# Patient Record
Sex: FEMALE | URBAN - METROPOLITAN AREA
[De-identification: names, ages, dates, MRNs, and addresses within clinical notes are randomized per-mention and may not be internally consistent; named-entity substitution may affect disease eponyms.]

---

## 2019-01-01 ENCOUNTER — APPOINTMENT (RX ONLY)
Dept: URBAN - METROPOLITAN AREA CLINIC 170 | Facility: CLINIC | Age: 84
Setting detail: DERMATOLOGY
End: 2019-01-01

## 2019-01-01 ENCOUNTER — RX ONLY (OUTPATIENT)
Age: 84
Setting detail: RX ONLY
End: 2019-01-01

## 2019-01-01 VITALS — DIASTOLIC BLOOD PRESSURE: 81 MMHG | HEART RATE: 65 BPM | SYSTOLIC BLOOD PRESSURE: 142 MMHG

## 2019-01-01 DIAGNOSIS — Z85.828 PERSONAL HISTORY OF OTHER MALIGNANT NEOPLASM OF SKIN: ICD-10-CM

## 2019-01-01 DIAGNOSIS — Z48.02 ENCOUNTER FOR REMOVAL OF SUTURES: ICD-10-CM

## 2019-01-01 DIAGNOSIS — L57.0 ACTINIC KERATOSIS: ICD-10-CM

## 2019-01-01 DIAGNOSIS — D22 MELANOCYTIC NEVI: ICD-10-CM

## 2019-01-01 DIAGNOSIS — Z48.817 ENCOUNTER FOR SURGICAL AFTERCARE FOLLOWING SURGERY ON THE SKIN AND SUBCUTANEOUS TISSUE: ICD-10-CM

## 2019-01-01 DIAGNOSIS — L81.4 OTHER MELANIN HYPERPIGMENTATION: ICD-10-CM

## 2019-01-01 DIAGNOSIS — D18.0 HEMANGIOMA: ICD-10-CM

## 2019-01-01 DIAGNOSIS — L82.1 OTHER SEBORRHEIC KERATOSIS: ICD-10-CM

## 2019-01-01 PROCEDURE — 11102 TANGNTL BX SKIN SINGLE LES: CPT

## 2019-01-01 PROCEDURE — ? ORDER TESTS

## 2019-01-01 PROCEDURE — 13101 CMPLX RPR TRUNK 2.6-7.5 CM: CPT

## 2019-01-01 PROCEDURE — 17003 DESTRUCT PREMALG LES 2-14: CPT

## 2019-01-01 PROCEDURE — 11103 TANGNTL BX SKIN EA SEP/ADDL: CPT

## 2019-01-01 PROCEDURE — ? SUTURE REMOVAL (GLOBAL PERIOD)

## 2019-01-01 PROCEDURE — ? REPAIR NOTE

## 2019-01-01 PROCEDURE — 17000 DESTRUCT PREMALG LESION: CPT

## 2019-01-01 PROCEDURE — 99212 OFFICE O/P EST SF 10 MIN: CPT | Mod: 25

## 2019-01-01 PROCEDURE — ? ADDITIONAL NOTES

## 2019-01-01 PROCEDURE — ? EXCISION

## 2019-01-01 PROCEDURE — 99024 POSTOP FOLLOW-UP VISIT: CPT

## 2019-01-01 PROCEDURE — 17313 MOHS 1 STAGE T/A/L: CPT

## 2019-01-01 PROCEDURE — ? COUNSELING

## 2019-01-01 PROCEDURE — 12032 INTMD RPR S/A/T/EXT 2.6-7.5: CPT

## 2019-01-01 PROCEDURE — 99214 OFFICE O/P EST MOD 30 MIN: CPT | Mod: 25

## 2019-01-01 PROCEDURE — ? BIOPSY BY SHAVE METHOD

## 2019-01-01 PROCEDURE — ? FULL BODY SKIN EXAM

## 2019-01-01 PROCEDURE — 17000 DESTRUCT PREMALG LESION: CPT | Mod: 59

## 2019-01-01 PROCEDURE — 11603 EXC TR-EXT MAL+MARG 2.1-3 CM: CPT

## 2019-01-01 PROCEDURE — 99213 OFFICE O/P EST LOW 20 MIN: CPT | Mod: 25

## 2019-01-01 PROCEDURE — 11602 EXC TR-EXT MAL+MARG 1.1-2 CM: CPT

## 2019-01-01 PROCEDURE — ? POST-OP WOUND EVALUATION

## 2019-01-01 PROCEDURE — ? LIQUID NITROGEN

## 2019-01-01 PROCEDURE — ? PRESCRIPTION

## 2019-01-01 PROCEDURE — ? MOHS SURGERY

## 2019-01-01 RX ORDER — DOXYCYCLINE HYCLATE 100 MG/1
TABLET, COATED ORAL
Qty: 20 | Refills: 0 | Status: ERX

## 2019-01-01 RX ORDER — IMIQUIMOD 50 MG/G
CREAM TOPICAL
Qty: 2 | Refills: 2 | Status: ERX | COMMUNITY
Start: 2019-01-01

## 2019-01-01 RX ADMIN — IMIQUIMOD: 50 CREAM TOPICAL at 00:00

## 2019-01-01 ASSESSMENT — LOCATION DETAILED DESCRIPTION DERM
LOCATION DETAILED: LEFT DISTAL DORSAL FOREARM
LOCATION DETAILED: LEFT PROXIMAL DORSAL FOREARM
LOCATION DETAILED: RIGHT LATERAL SUPERIOR CHEST
LOCATION DETAILED: LEFT MEDIAL FOREHEAD
LOCATION DETAILED: LEFT SUPERIOR LATERAL UPPER BACK
LOCATION DETAILED: UPPER STERNUM
LOCATION DETAILED: MIDDLE STERNUM
LOCATION DETAILED: MIDDLE STERNUM
LOCATION DETAILED: XIPHOID
LOCATION DETAILED: LEFT PROXIMAL POSTERIOR UPPER ARM
LOCATION DETAILED: RIGHT DISTAL POSTERIOR UPPER ARM
LOCATION DETAILED: LEFT LATERAL SUPERIOR CHEST
LOCATION DETAILED: LEFT SUPERIOR UPPER BACK
LOCATION DETAILED: LEFT MEDIAL BREAST 10-11:00 REGION
LOCATION DETAILED: LEFT LATERAL SUPERIOR CHEST
LOCATION DETAILED: LOWER STERNUM
LOCATION DETAILED: LEFT LATERAL SUPERIOR CHEST
LOCATION DETAILED: LEFT DISTAL POSTERIOR UPPER ARM
LOCATION DETAILED: STERNAL NOTCH
LOCATION DETAILED: RIGHT LATERAL SUPERIOR CHEST
LOCATION DETAILED: RIGHT LATERAL SUPERIOR CHEST
LOCATION DETAILED: LEFT MEDIAL SUPERIOR CHEST

## 2019-01-01 ASSESSMENT — LOCATION SIMPLE DESCRIPTION DERM
LOCATION SIMPLE: CHEST
LOCATION SIMPLE: LEFT UPPER BACK
LOCATION SIMPLE: RIGHT POSTERIOR UPPER ARM
LOCATION SIMPLE: LEFT FOREARM
LOCATION SIMPLE: CHEST
LOCATION SIMPLE: LEFT POSTERIOR UPPER ARM
LOCATION SIMPLE: CHEST
LOCATION SIMPLE: LEFT BREAST
LOCATION SIMPLE: ABDOMEN
LOCATION SIMPLE: LEFT FOREHEAD
LOCATION SIMPLE: CHEST

## 2019-01-01 ASSESSMENT — LOCATION ZONE DERM
LOCATION ZONE: ARM
LOCATION ZONE: TRUNK
LOCATION ZONE: TRUNK
LOCATION ZONE: FACE
LOCATION ZONE: TRUNK

## 2019-02-13 ENCOUNTER — APPOINTMENT (RX ONLY)
Dept: URBAN - METROPOLITAN AREA CLINIC 170 | Facility: CLINIC | Age: 84
Setting detail: DERMATOLOGY
End: 2019-02-13

## 2019-02-13 DIAGNOSIS — L72.0 EPIDERMAL CYST: ICD-10-CM

## 2019-02-13 PROBLEM — I10 ESSENTIAL (PRIMARY) HYPERTENSION: Status: ACTIVE | Noted: 2019-02-13

## 2019-02-13 PROCEDURE — ? PRESCRIPTION SAMPLES PROVIDED

## 2019-02-13 PROCEDURE — ? PRESCRIPTION

## 2019-02-13 PROCEDURE — 99202 OFFICE O/P NEW SF 15 MIN: CPT

## 2019-02-13 PROCEDURE — ? ADDITIONAL NOTES

## 2019-02-13 RX ORDER — DOXYCYCLINE HYCLATE 100 MG/1
TABLET, COATED ORAL
Qty: 28 | Refills: 0 | Status: ERX | COMMUNITY
Start: 2019-02-13

## 2019-02-13 RX ADMIN — DOXYCYCLINE HYCLATE 1: 100 TABLET, COATED ORAL at 00:00

## 2019-02-13 ASSESSMENT — LOCATION DETAILED DESCRIPTION DERM: LOCATION DETAILED: LEFT CLAVICULAR SKIN

## 2019-02-13 ASSESSMENT — LOCATION SIMPLE DESCRIPTION DERM: LOCATION SIMPLE: LEFT CLAVICULAR SKIN

## 2019-02-13 ASSESSMENT — LOCATION ZONE DERM: LOCATION ZONE: TRUNK

## 2019-02-13 NOTE — PROCEDURE: ADDITIONAL NOTES
Detail Level: Detailed
Additional Notes: Polysporin BID to area. If no significant decrease in erythema, plan shave biopsy to r/o BCC/SCC in two weeks.

## 2019-03-06 ENCOUNTER — APPOINTMENT (RX ONLY)
Dept: URBAN - METROPOLITAN AREA CLINIC 170 | Facility: CLINIC | Age: 84
Setting detail: DERMATOLOGY
End: 2019-03-06

## 2019-03-06 PROBLEM — D48.5 NEOPLASM OF UNCERTAIN BEHAVIOR OF SKIN: Status: ACTIVE | Noted: 2019-03-06

## 2019-03-06 PROCEDURE — 11102 TANGNTL BX SKIN SINGLE LES: CPT

## 2019-03-06 PROCEDURE — ? BIOPSY BY SHAVE METHOD

## 2019-03-06 NOTE — PROCEDURE: BIOPSY BY SHAVE METHOD
Anesthesia Volume In Cc (Will Not Render If 0): 5
Electrodesiccation And Curettage Text: The wound bed was treated with electrodesiccation and curettage after the biopsy was performed.
Dressing: Band-Aid
Type Of Destruction Used: Curettage
Billing Type: Third-Party Bill
Biopsy Type: H and E
Path Notes (To The Dermatopathologist): Check margins for R/O atypia only
Hemostasis: Aluminum Chloride and Electrocautery
Notification Instructions: Patient will be notified of biopsy results. However, patient instructed to call the office if not contacted within 2 weeks.
Curettage Text: The wound bed was treated with curettage after the biopsy was performed.
Silver Nitrate Text: The wound bed was treated with silver nitrate after the biopsy was performed.
Post-Care Instructions: I reviewed with the patient in detail post-care instructions. Patient is to keep the biopsy site dry overnight, and then apply polysporin twice daily until healed.
Was A Bandage Applied: Yes
Cryotherapy Text: The wound bed was treated with cryotherapy after the biopsy was performed.
Destruction After The Procedure: No
Consent: Written consent was obtained and risks were reviewed including but not limited to scarring, infection, bleeding, scabbing, incomplete removal, nerve damage and allergy to anesthesia.
Biopsy Method: double edge Personna blade
Detail Level: Detailed
Lab: 11
Depth Of Biopsy: dermis
X Size Of Lesion In Cm: 0
Electrodesiccation Text: The wound bed was treated with electrodesiccation after the biopsy was performed.
Size Of Lesion In Cm: 2.4
Anesthesia Type: 2% lidocaine with epinephrine
Wound Care: Bacitracin

## 2019-03-06 NOTE — HPI: CYST
How Severe Is Your Cyst?: moderate
Is This A New Presentation, Or A Follow-Up?: Follow Up Cyst
Additional History: Patient finished the doxycycline last Thursday. No improvement. \\n

## 2019-04-10 PROBLEM — C44.529 SQUAMOUS CELL CARCINOMA OF SKIN OF OTHER PART OF TRUNK: Status: ACTIVE | Noted: 2019-01-01

## 2019-04-10 NOTE — PROCEDURE: EXCISION
Yes, you are right.  No worker's comp or any injury in her history.  I ordered work conditioning because I think it will be needed to get her back to work, especially since it has to be full duty with no restrictions.   Advancement Flap (Double) Text: The defect edges were debeveled with a #15 scalpel blade.  Given the location of the defect and the proximity to free margins a double advancement flap was deemed most appropriate.  Using a sterile surgical marker, the appropriate advancement flaps were drawn incorporating the defect and placing the expected incisions within the relaxed skin tension lines where possible.    The area thus outlined was incised deep to adipose tissue with a #15 scalpel blade.  The skin margins were undermined to an appropriate distance in all directions utilizing iris scissors.

## 2019-04-10 NOTE — PROCEDURE: EXCISION
Path Notes (To The Dermatopathologist): Please check margins. see attachment for previous path from [facility]

## 2019-04-12 PROBLEM — Z48.817 ENCOUNTER FOR SURGICAL AFTERCARE FOLLOWING SURGERY ON THE SKIN AND SUBCUTANEOUS TISSUE: Status: ACTIVE | Noted: 2019-01-01

## 2019-04-12 NOTE — PROCEDURE: POST-OP WOUND EVALUATION
Sutures?: intact
Wound Diameter In Cm(Optional): 0
Patient To Follow-Up With?: our clinic
Add 16813 Cpt? (Important Note: In 2017 The Use Of 40820 Is Being Tracked By Cms To Determine Future Global Period Reimbursement For Global Periods): yes
Wound Granulation?: early
Wound Discharge?: moderate discharge
Detail Level: Detailed
Wound Crusting?: crusted
Wound Color?: pink
Wound Evaluated By (Optional): Siria Hastings

## 2019-04-24 PROBLEM — Z48.02 ENCOUNTER FOR REMOVAL OF SUTURES: Status: ACTIVE | Noted: 2019-01-01

## 2019-04-24 NOTE — PROCEDURE: SUTURE REMOVAL (GLOBAL PERIOD)
Detail Level: Detailed
Add 96608 Cpt? (Important Note: In 2017 The Use Of 84142 Is Being Tracked By Cms To Determine Future Global Period Reimbursement For Global Periods): no

## 2019-07-22 PROBLEM — D18.01 HEMANGIOMA OF SKIN AND SUBCUTANEOUS TISSUE: Status: ACTIVE | Noted: 2019-01-01

## 2019-07-22 PROBLEM — Z85.828 PERSONAL HISTORY OF OTHER MALIGNANT NEOPLASM OF SKIN: Status: ACTIVE | Noted: 2019-01-01

## 2019-07-22 PROBLEM — L81.4 OTHER MELANIN HYPERPIGMENTATION: Status: ACTIVE | Noted: 2019-01-01

## 2019-07-22 PROBLEM — D48.5 NEOPLASM OF UNCERTAIN BEHAVIOR OF SKIN: Status: ACTIVE | Noted: 2019-01-01

## 2019-07-22 PROBLEM — L57.0 ACTINIC KERATOSIS: Status: ACTIVE | Noted: 2019-01-01

## 2019-07-22 PROBLEM — L82.1 OTHER SEBORRHEIC KERATOSIS: Status: ACTIVE | Noted: 2019-01-01

## 2019-07-22 PROBLEM — D22.5 MELANOCYTIC NEVI OF TRUNK: Status: ACTIVE | Noted: 2019-01-01

## 2019-07-22 NOTE — PROCEDURE: COUNSELING
Detail Level: Generalized
Detail Level: Detailed
Detail Level: Zone
Patient Specific Counseling (Will Not Stick From Patient To Patient): \\nPt has multiple AKs however deferred LN2 given her age, she prefers only the symptomatic ones be treated

## 2019-07-22 NOTE — PROCEDURE: BIOPSY BY SHAVE METHOD
Size Of Lesion In Cm: 0.5
Notification Instructions: Patient will be notified of biopsy results. However, patient instructed to call the office if not contacted within 2 weeks.
Cryotherapy Text: The wound bed was treated with cryotherapy after the biopsy was performed.
Additional Anesthesia Volume In Cc (Will Not Render If 0): 0
Wound Care: Petrolatum
Lab Facility: [id number]
Destruction After The Procedure: No
Anesthesia Type: 1% Xylocaine with epinephrine
Post-Care Instructions: I reviewed with the patient in detail post-care instructions. Patient is to keep the biopsy site dry overnight, and then apply bacitracin twice daily until healed. Patient may apply hydrogen peroxide soaks to remove any crusting.
Biopsy Type: H and E
Consent: Written consent was obtained and risks were reviewed including but not limited to scarring, infection, bleeding, scabbing, incomplete removal, nerve damage and allergy to anesthesia.
X Size Of Lesion In Cm: 0.6
Depth Of Biopsy: dermis
Electrodesiccation Text: The wound bed was treated with electrodesiccation after the biopsy was performed.
Silver Nitrate Text: The wound bed was treated with silver nitrate after the biopsy was performed.
Dressing: Band-Aid
Anesthesia Volume In Cc (Will Not Render If 0): 1
Type Of Destruction Used: Curettage
Electrodesiccation And Curettage Text: The wound bed was treated with electrodesiccation and curettage after the biopsy was performed.
Lab: -[id number]
Curettage Text: The wound bed was treated with curettage after the biopsy was performed.
Anticipated Plan (Based On Presumed Biopsy Results): edc vs exc if +. pt prefers edc
Detail Level: Detailed
Billing Type: Third-Party Bill
Biopsy Method: double edge Personna blade
Hemostasis: Electrodesiccation and Aluminum Chloride
Was A Bandage Applied: Yes
X Size Of Lesion In Cm: 2.3
Anticipated Plan (Based On Presumed Biopsy Results): ideally aldara given size, however may need to consider mohs if invasive
Size Of Lesion In Cm: 2.5

## 2019-08-20 PROBLEM — C44.519 BASAL CELL CARCINOMA OF SKIN OF OTHER PART OF TRUNK: Status: ACTIVE | Noted: 2019-01-01

## 2019-08-20 PROBLEM — D04.5 CARCINOMA IN SITU OF SKIN OF TRUNK: Status: ACTIVE | Noted: 2019-01-01

## 2019-08-20 NOTE — PROCEDURE: EXCISION
None Bilobed Transposition Flap Text: The defect edges were debeveled with a #15 scalpel blade.  Given the location of the defect and the proximity to free margins a bilobed transposition flap was deemed most appropriate.  Using a sterile surgical marker, an appropriate bilobe flap drawn around the defect.    The area thus outlined was incised deep to adipose tissue with a #15 scalpel blade.  The skin margins were undermined to an appropriate distance in all directions utilizing iris scissors.

## 2019-09-03 PROBLEM — Z48.02 ENCOUNTER FOR REMOVAL OF SUTURES: Status: ACTIVE | Noted: 2019-01-01

## 2019-09-03 NOTE — PROCEDURE: SUTURE REMOVAL (GLOBAL PERIOD)
Detail Level: Detailed
Add 80004 Cpt? (Important Note: In 2017 The Use Of 01718 Is Being Tracked By Cms To Determine Future Global Period Reimbursement For Global Periods): no

## 2019-09-27 PROBLEM — C44.519 BASAL CELL CARCINOMA OF SKIN OF OTHER PART OF TRUNK: Status: ACTIVE | Noted: 2019-01-01

## 2019-09-27 NOTE — PROCEDURE: REPAIR NOTE
84 Closure 2 Information: This tab is for additional flaps and grafts, including complex repair and grafts and complex repair and flaps. You can also specify a different location for the additional defect, if the location is the same you do not need to select a new one. We will insert the automated text for the repair you select below just as we do for solitary flaps and grafts. Please note that at this time if you select a location with a different insurance zone you will need to override the ICD10 and CPT if appropriate.

## 2019-09-27 NOTE — PROCEDURE: MOHS SURGERY
D/C note       Subjective   Vesta Landry reports: 0/10 knee pain at rest. States she had to walk a lot of steps yesterday and her knees and back did very well. States she had a little soreness at the end but she feels fine today. States she still has a little knee pain occasionally but mostly when the weather is bad.     Objective       Active Range of Motion   Left Knee   Flexion: 118 degrees   Extension: WFL    Right Knee   Flexion: 115 degrees   Extension: WFL    Strength/Myotome Testing     Left Hip   Planes of Motion   Flexion: 4+  Abduction: 4+    Right Hip   Planes of Motion   Flexion: 4+  Abduction: 4+    Left Knee   Flexion: 4  Extension: 4    Right Knee   Flexion: 4  Extension: 4     See Exercise, Manual, and Modality Logs for complete treatment.       Assessment/Plan  Patient has met all goals at this time and reports confidence in resuming all regular activities without increase in knee or back pain. Patient educated on performing home exercise program to maintain knee, hip, and back health.   Other  Patient discharged at this time.          Manual Therapy:         mins  12077;  Therapeutic Exercise:    34     mins  80856;     Neuromuscular Lenard:        mins  20018;    Therapeutic Activity:    10      mins  81471;     Gait Training:           mins  94277;     Ultrasound:          mins  89509;   Iontophoresis          mins  48754   Electrical Stimulation:         mins  07215 ( );  Dry Needling          mins self-pay  Fluidotherapy          mins 82209    Timed Treatment:   44   mins   Total Treatment:     54   mins    Chloe Singh PT  Physical Therapist   Home Suture Removal Text: Patient was provided instructions on removing sutures and will remove their sutures at home.  If they have any questions or difficulties they will call the office.

## 2019-09-27 NOTE — PROCEDURE: MOHS SURGERY
Body Location Override (Optional - Billing Will Still Be Based On Selected Body Map Location If Applicable): right lateral superior chest

## 2019-09-27 NOTE — PROCEDURE: REPAIR NOTE
Prior authorization submitted for lesion destruction, faxed clinical and form to [facility] for review.   Referring Provider (Optional): Dr. Patel

## 2019-10-10 PROBLEM — Z48.02 ENCOUNTER FOR REMOVAL OF SUTURES: Status: ACTIVE | Noted: 2019-01-01

## 2019-10-10 NOTE — PROCEDURE: ADDITIONAL NOTES
Additional Notes: \\nCulture above taken from lesion on left mid back where pt has been using Aldara. Can d/c aldara. Malodorous drainage noted today so wound cx obtained. Will reeval need for aldara vs other tx in december.
Detail Level: Simple

## 2019-10-10 NOTE — PROCEDURE: SUTURE REMOVAL (GLOBAL PERIOD)
Detail Level: Detailed
Add 12041 Cpt? (Important Note: In 2017 The Use Of 73665 Is Being Tracked By Cms To Determine Future Global Period Reimbursement For Global Periods): yes

## 2019-10-10 NOTE — PROCEDURE: ORDER TESTS
Billing Type: Third-Party Bill
Performing Laboratory: -103
Clinical Notes (To The Lab): Do sensitivity
Expected Date Of Service: 10/10/2019
Bill For Surgical Tray: no

## 2019-12-18 PROBLEM — D18.01 HEMANGIOMA OF SKIN AND SUBCUTANEOUS TISSUE: Status: ACTIVE | Noted: 2019-01-01

## 2019-12-18 PROBLEM — L82.1 OTHER SEBORRHEIC KERATOSIS: Status: ACTIVE | Noted: 2019-01-01

## 2019-12-18 PROBLEM — L81.4 OTHER MELANIN HYPERPIGMENTATION: Status: ACTIVE | Noted: 2019-01-01

## 2019-12-18 PROBLEM — Z85.828 PERSONAL HISTORY OF OTHER MALIGNANT NEOPLASM OF SKIN: Status: ACTIVE | Noted: 2019-01-01

## 2019-12-18 PROBLEM — L57.0 ACTINIC KERATOSIS: Status: ACTIVE | Noted: 2019-01-01

## 2019-12-18 PROBLEM — D22.5 MELANOCYTIC NEVI OF TRUNK: Status: ACTIVE | Noted: 2019-01-01

## 2019-12-18 NOTE — PROCEDURE: ADDITIONAL NOTES
Additional Notes: \\nHx of SCCis s/p Aldara and secondary infection on left mid back, clear today, NER. No additional topical tx required.
Detail Level: Simple

## 2021-12-02 NOTE — PROCEDURE: EXCISION
Complex Repair And O-L Flap Text: The defect edges were debeveled with a #15 scalpel blade.  The primary defect was closed partially with a complex linear closure.  Given the location of the remaining defect, shape of the defect and the proximity to free margins an O-L flap was deemed most appropriate for complete closure of the defect.  Using a sterile surgical marker, an appropriate flap was drawn incorporating the defect and placing the expected incisions within the relaxed skin tension lines where possible.    The area thus outlined was incised deep to adipose tissue with a #15 scalpel blade.  The skin margins were undermined to an appropriate distance in all directions utilizing iris scissors. never used

## 2022-05-11 NOTE — PROCEDURE: REPAIR NOTE
O-T Advancement Flap Text: The defect edges were debeveled with a #15 scalpel blade.  Given the location of the defect, shape of the defect and the proximity to free margins an O-T advancement flap was deemed most appropriate.  Using a sterile surgical marker, an appropriate advancement flap was drawn incorporating the defect and placing the expected incisions within the relaxed skin tension lines where possible.    The area thus outlined was incised deep to adipose tissue with a #15 scalpel blade.  The skin margins were undermined to an appropriate distance in all directions utilizing iris scissors. Partial Purse String (Intermediate) Text: Given the location of the defect and the characteristics of the surrounding skin an intermediate purse string closure was deemed most appropriate.  Undermining was performed circumfirentially around the surgical defect.  A purse string suture was then placed and tightened. Wound tension only allowed a partial closure of the circular defect.

## 2023-03-28 NOTE — PROCEDURE: EXCISION
Chart reviewed for length of stay nutrition assessment.  Patient being assessed by another clinician at time of visit, and therefore, RDN was unable to conduct diet interview, assessment.  To reattempt visiting with patient at a later time/date.      Katerina Gonzalez, MS, RDN, CDN  Pager [phone]  Also available on [place] Teams X Size Of Lesion In Cm (Optional): 2.1

## 2023-07-04 NOTE — PROCEDURE: REPAIR NOTE
MSt 2 or > MST 2 or > Partial Purse String (Simple) Text: Given the location of the defect and the characteristics of the surrounding skin a simple purse string closure was deemed most appropriate.  Undermining was performed circumfirentially around the surgical defect.  A purse string suture was then placed and tightened. Wound tension only allowed a partial closure of the circular defect.